# Patient Record
Sex: MALE | Race: OTHER
[De-identification: names, ages, dates, MRNs, and addresses within clinical notes are randomized per-mention and may not be internally consistent; named-entity substitution may affect disease eponyms.]

---

## 2018-03-21 ENCOUNTER — HOSPITAL ENCOUNTER (EMERGENCY)
Dept: HOSPITAL 72 - EMR | Age: 83
Discharge: HOME | End: 2018-03-21
Payer: MEDICARE

## 2018-03-21 VITALS — HEIGHT: 65 IN | BODY MASS INDEX: 28.32 KG/M2 | WEIGHT: 170 LBS

## 2018-03-21 VITALS — DIASTOLIC BLOOD PRESSURE: 72 MMHG | SYSTOLIC BLOOD PRESSURE: 190 MMHG

## 2018-03-21 VITALS — DIASTOLIC BLOOD PRESSURE: 81 MMHG | SYSTOLIC BLOOD PRESSURE: 177 MMHG
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
## 2018-03-21 DIAGNOSIS — S80.11XA: Primary | ICD-10-CM

## 2018-03-21 DIAGNOSIS — S80.811A: ICD-10-CM
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
## 2018-03-21 DIAGNOSIS — I10: ICD-10-CM

## 2018-03-21 PROCEDURE — 99283 EMERGENCY DEPT VISIT LOW MDM: CPT

## 2018-03-21 NOTE — EMERGENCY ROOM REPORT
History of Present Illness


General


Chief Complaint:  Multiple Trauma/Fall


Source:  Patient, Family Member





Present Illness


HPI


85-year-old male presents with accidental trip and fall at home with bleeding 

to right shin.


Patient states that 5 hours ago he tripped and fell, had a cut to right shin, 

no bleeding.


When patient went brought [facility] half an hour ago he had high pressure stream 

of blood shoot out of wound on right leg.


EMS was called, pressure bandage applied


he denies pain to leg, or foot, ankle


Denies hitting head


Allergies:  


Coded Allergies:  


     No Known Allergies (Unverified , 3/21/18)





Patient History


Past Medical History:  HTN


Past Surgical History:  none


Pertinent Family History:  none


Social History:  Denies: smoking, alcohol use, drug use


Immunizations:  UTD


Reviewed Nursing Documentation:  PMH: Agreed, PSxH: Agreed





Nursing Documentation-PMH


Past Medical History:  No History, Except For


Hx Hypertension:  Yes





Review of Systems


All Other Systems:  negative except mentioned in HPI





Physical Exam





Vital Signs








  Date Time  Temp Pulse Resp B/P (MAP) Pulse Ox O2 Delivery O2 Flow Rate FiO2


 


3/21/18 19:02 98.0 62 20 190/72 95 Room Air  





 98.1       








Sp02 EP Interpretation:  reviewed, normal


General Appearance:  normal inspection, well appearing, no apparent distress, 

alert, GCS 15, non-toxic


Head:  normocephalic, atraumatic


Eyes:  bilateral eye PERRL, bilateral eye EOMI


ENT:  normal ENT inspection, hearing grossly normal, normal pharynx, no 

angioedema, normal voice, TMs + canals normal, uvula midline, moist mucus 

membranes


Neck:  normal inspection, full range of motion, supple, thyroid normal, no 

meningismus, no bony tend


Respiratory:  normal inspection, lungs clear, normal breath sounds, no rhonchi, 

no respiratory distress, no retraction, no accessory muscle use, no wheezing, 

speaking full sentences


Cardiovascular #1:  regular rate, rhythm, no edema, no JVD, normal capillary 

refill


Gastrointestinal:  normal inspection, normal bowel sounds, non tender, soft, no 

mass, no peritonitis, non-distended, no guarding, no hernia, no pulsatile mass


Genitourinary:  no CVA tenderness


Musculoskeletal:  normal inspection, back normal, normal range of motion, no 

calf tenderness, pelvis stable, Galina's Sign negative, other - Right shin: 6cm 

jagged abrasion with underlying hematoma/overlying purplish colored skin and 

open abrasion to lower part. When bandage removed and pressure applied, blood 

squirted out laterally at high pressure.


Neurologic:  normal inspection, alert, oriented x3, responsive, CNs III-XII nml 

as tested, motor strength/tone normal, cerebellar normal, normal gait, speech 

normal


Psychiatric:  normal inspection, judgement/insight normal, mood/affect normal, 

no suicidal/homicidal ideation, no delusions


Skin:  normal inspection, normal color, no rash


Lymphatic:  normal inspection, no adenopathy





Medical Decision Making


Diagnostic Impression:  


 Primary Impression:  


 Fall


 Qualified Codes:  W19.XXXA - Unspecified fall, initial encounter


 Additional Impressions:  


 Lower extremity injury


 Qualified Codes:  S89.91XA - Unspecified injury of right lower leg, initial 

encounter


 Hematoma


ER Course


VSS, afebrile


Bleeding at high pressure likely from hematoma


Lidocaine/epi injected to area, hematoma evacuated with steady pressure applied


Patient not on ASA< AC


No laceration to repair


Tetanus updated


Xray: no fx


Pressure dressing applied with ACE wrap








ER course:


Patient has remained stable during ED stay.





Disposition:





Patient is to be discharged to home


Patient is instructed to follow up with their primary care doctor within 5 

days. 


Strict return precautions discussed with patient such as fever, chills, 

worsening/severe pain, nausea, vomiting, which may indicate severe illness. 

Patient verbalizes understanding and agrees with plan. 





Please note that this Emergency Department Report was dictated using GOS[employer] technology software, occasionally this can lead to 

erroneous entry secondary to interpretation by the dictation equipment


Other X-Ray Diagnostic Results


Other X-Ray Diagnostic Results :  


   X-Ray ordered:  Tib fib


   # of Views/Limited Vs Complete:  2 View


   EP Interpretation:  Yes


   Interpretation:  no dislocation, no soft tissue swelling, no fractures


   Impression:  No acute disease


   Electronically Signed by:  Dr Jessy Bowman MD





Last Vital Signs








  Date Time  Temp Pulse Resp B/P (MAP) Pulse Ox O2 Delivery O2 Flow Rate FiO2


 


3/21/18 19:02 98.0 62 20 190/72 95 Room Air  





 98.1       








Status:  improved


Disposition:  HOME, SELF-CARE











JESSY BOWMAN M.D. Mar 21, 2018 19:56

## 2018-03-22 NOTE — DIAGNOSTIC IMAGING REPORT
Indication: Reason For Exam: PAIN

 

Technique: 2 views of the right tibia and fibula

 

Comparison: none

 

Findings: There is some periosteal new bone along the posterior proximal fibular

shaft. No acute fractures. No dislocations. No radiopaque foreign body

 

Impression: No acute process